# Patient Record
Sex: FEMALE | Race: WHITE | Employment: STUDENT | URBAN - METROPOLITAN AREA
[De-identification: names, ages, dates, MRNs, and addresses within clinical notes are randomized per-mention and may not be internally consistent; named-entity substitution may affect disease eponyms.]

---
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
## 2024-01-02 ENCOUNTER — HOSPITAL ENCOUNTER (OUTPATIENT)
Age: 19
Discharge: HOME | End: 2024-01-02
Payer: COMMERCIAL

## 2024-01-02 VITALS
DIASTOLIC BLOOD PRESSURE: 93 MMHG | HEART RATE: 92 BPM | TEMPERATURE: 99 F | SYSTOLIC BLOOD PRESSURE: 130 MMHG | OXYGEN SATURATION: 100 % | RESPIRATION RATE: 20 BRPM

## 2024-01-02 DIAGNOSIS — R19.7 NAUSEA VOMITING AND DIARRHEA: Primary | ICD-10-CM

## 2024-01-02 DIAGNOSIS — R11.2 NAUSEA VOMITING AND DIARRHEA: Primary | ICD-10-CM

## 2024-01-02 DIAGNOSIS — R10.9 ABDOMINAL PAIN OF UNKNOWN ETIOLOGY: ICD-10-CM

## 2024-01-02 DIAGNOSIS — Z11.52 ENCOUNTER FOR SCREENING FOR COVID-19: ICD-10-CM

## 2024-01-02 LAB
B-HCG UR QL: NEGATIVE
BILIRUB UR QL STRIP: NEGATIVE
CLARITY UR: CLEAR
GLUCOSE UR STRIP-MCNC: NEGATIVE MG/DL
KETONES UR STRIP-MCNC: 80 MG/DL
NITRITE UR QL STRIP: NEGATIVE
PH UR STRIP: 5.5 [PH]
POCT INFLUENZA A: NEGATIVE
POCT INFLUENZA B: NEGATIVE
PROT UR STRIP-MCNC: NEGATIVE MG/DL
SARS-COV-2 RNA RESP QL NAA+PROBE: NOT DETECTED
SP GR UR STRIP: >=1.03
UROBILINOGEN UR STRIP-ACNC: <2 MG/DL

## 2024-01-02 RX ORDER — DICYCLOMINE HCL 20 MG
20 TABLET ORAL 4 TIMES DAILY PRN
Qty: 30 TABLET | Refills: 0 | Status: SHIPPED | OUTPATIENT
Start: 2024-01-02 | End: 2024-02-01

## 2024-01-02 RX ORDER — SODIUM CHLORIDE 9 MG/ML
1000 INJECTION, SOLUTION INTRAVENOUS ONCE
Status: COMPLETED | OUTPATIENT
Start: 2024-01-02 | End: 2024-01-02

## 2024-01-02 RX ORDER — ONDANSETRON 2 MG/ML
4 INJECTION INTRAMUSCULAR; INTRAVENOUS ONCE
Status: COMPLETED | OUTPATIENT
Start: 2024-01-02 | End: 2024-01-02

## 2024-01-02 RX ORDER — ONDANSETRON 4 MG/1
4 TABLET, ORALLY DISINTEGRATING ORAL EVERY 4 HOURS PRN
Qty: 10 TABLET | Refills: 0 | Status: SHIPPED | OUTPATIENT
Start: 2024-01-02 | End: 2024-01-09

## 2024-01-02 RX ORDER — LOPERAMIDE HYDROCHLORIDE 2 MG/1
4 TABLET ORAL AS NEEDED
Qty: 20 TABLET | Refills: 0 | Status: SHIPPED | OUTPATIENT
Start: 2024-01-02 | End: 2024-02-01

## 2024-01-02 NOTE — ED PROVIDER NOTES
Patient Seen in: [facility]      History   No chief complaint on file.    Stated Complaint: stomach issues    Subjective:   HPI  Patient is a 18-year-old female who presents to the immediate care center with mother at bedside reporting concern for nausea, vomiting, diarrhea, and abdominal pain that has been persistent since yesterday.  She feels weak, but has had no headache or dizziness.  The pain in her abdomen is episodic, and in variable location.  She denies recent trauma.          Objective:   Past Medical History:   Diagnosis Date    Anxiety     COVID-19 09/03/2020    History of hidradenitis suppurativa     Axillary and vulvar    Hx of cold sores     HSV TYPE 1     Ovarian cyst     HX OF     Seizures (HCC)     AT AGE 1               Past Surgical History:   Procedure Laterality Date    CYST REMOVAL  2018    WRIST FRACTURE SURGERY Right                 Social History     Socioeconomic History    Marital status: Single   Tobacco Use    Smoking status: Never    Smokeless tobacco: Never   Substance and Sexual Activity    Alcohol use: Never     Alcohol/week: 0.0 standard drinks of alcohol    Drug use: Never    Sexual activity: Never     Birth control/protection: Inserts     Comment: NUVA RING              Review of Systems   Constitutional:  Positive for appetite change and fatigue. Negative for chills and fever.   HENT:  Negative for congestion.    Respiratory:  Negative for cough and shortness of breath.    Cardiovascular:  Negative for chest pain.   Gastrointestinal:  Positive for abdominal pain, diarrhea, nausea and vomiting. Negative for blood in stool.   Genitourinary:  Negative for dysuria.   Musculoskeletal:  Negative for arthralgias and myalgias.   Skin:  Negative for rash.   Neurological:  Negative for dizziness and headaches.       Positive for stated complaint: stomach issues  Other systems are as noted in HPI.  Constitutional and vital signs reviewed.      All other systems reviewed and  negative except as noted above.    Physical Exam     ED Triage Vitals [01/02/24 1410]   BP (!) 130/93   Pulse 92   Resp 20   Temp 98.8 °F (37.1 °C)   Temp src Temporal   SpO2 100 %   O2 Device None (Room air)       Current:BP (!) 130/93   Pulse 92   Temp 98.8 °F (37.1 °C) (Temporal)   Resp 20   LMP 01/02/2024   SpO2 100%         Physical Exam  Vitals and nursing note reviewed.   Constitutional:       General: She is not in acute distress.     Appearance: She is ill-appearing.   Eyes:      Conjunctiva/sclera: Conjunctivae normal.   Pulmonary:      Effort: Pulmonary effort is normal. No respiratory distress.   Abdominal:      General: Abdomen is flat.      Tenderness: There is generalized abdominal tenderness (upper abdomen). Negative signs include McBurney's sign, psoas sign and obturator sign.   Musculoskeletal:      Cervical back: Normal range of motion and neck supple.   Skin:     General: Skin is warm and dry.      Findings: No rash.   Neurological:      Mental Status: She is alert and oriented to person, place, and time.   Psychiatric:         Behavior: Behavior normal.               ED Course     Labs Reviewed   [facility] POCT URINALYSIS DIPSTICK - Abnormal; Notable for the following components:       Result Value    Ketone, Urine 80 (*)     Blood, Urine Trace-Intact (*)     Leukocyte esterase urine Small (*)     All other components within normal limits   POCT PREGNANCY URINE - Normal   POCT FLU TEST - Normal    Narrative:     This assay is a rapid molecular in vitro test utilizing nucleic acid amplification of influenza A and B viral RNA.   RAPID SARS-COV-2 BY PCR - Normal     Medications   sodium chloride 0.9% infusion 1,000 mL (0 mL Intravenous Stopped 1/2/24 1650)   ondansetron (Zofran) 4 MG/2ML injection 4 mg (4 mg Intravenous Given 1/2/24 1517)     Reevaluation after IV fluids and medications: Patient states she feels considerably better, pain is nearly resolved.  She has no nausea.  She is taking p.o.  fluids in the department prior to disposition without difficulty.                 MDM      Differential diagnoses were reviewed in detail prior to disposition.  They were informed of limited resources in the immediate care center to consider many of these differentials.  For this reason we did have conversation about whether she needed to be seen in the emergency department.  Patient and mother both state they would prefer to monitor her closely at home, but they both understand risks and that they will seek immediate follow-up in the emergency department if symptoms worsen.                                       Medical Decision Making  Differential diagnoses considered today include, but are not exclusive of: Acute appendicitis, acute cholecystitis, pancreatitis, pelvic inflammatory disease, pregnancy/ectopic pregnancy, ovarian cyst/torsion, colitis and enteritis.      Problems Addressed:  Abdominal pain of unknown etiology: undiagnosed new problem with uncertain prognosis    Amount and/or Complexity of Data Reviewed  Independent Historian: parent  Labs:  Decision-making details documented in ED Course.    Risk  OTC drugs.  Prescription drug management.        Disposition and Plan     Clinical Impression:  1. Nausea vomiting and diarrhea    2. Encounter for screening for COVID-19    3. Abdominal pain of unknown etiology         Disposition:  Discharge  1/2/2024  4:46 pm    1) Rest  2) push oral fluids  3) tylenol OTC as directed for fever/ pain  4) f/u PCP 5-7 days if symptoms persist  5) Go to ED if persistent vomiting, chest pain, shortness of air, headache, dizziness, neck pain          Follow-up:  Diana Leonard  [address] [address]  [phone]    Schedule an appointment as soon as possible for a visit in 1 week      [facility]  [address] [facility] [id number] [phone]  Go to   If symptoms worsen          Medications Prescribed:  Current  Discharge Medication List        START taking these medications    Details   ondansetron 4 MG Oral Tablet Dispersible Take 1 tablet (4 mg total) by mouth every 4 (four) hours as needed for Nausea.  Qty: 10 tablet, Refills: 0      Loperamide HCl 2 MG Oral Tab Take 2 tablets (4 mg total) by mouth as needed for Diarrhea. Take 2 tablets by mouth with first loose stool; then, take 1 tablet by mouth after each loose stool to a maximum dose of 16 mg (8 tablets) in 24 hours.  Qty: 20 tablet, Refills: 0      dicyclomine 20 MG Oral Tab Take 1 tablet (20 mg total) by mouth 4 (four) times daily as needed.  Qty: 30 tablet, Refills: 0

## 2024-01-02 NOTE — ED INITIAL ASSESSMENT (HPI)
Pt states having symptoms that began last night. Pt states having diarrhea, vomiting and stomach pain. Pt states having vomiting , pt states has been unable to keep anything down.

## (undated) NOTE — ED AVS SNAPSHOT
Parent/Legal Guardian Access to the Online [employer] Record of a Patient 15to 16Years Old  Return completed form by Secure email to [place] [facility]/Medical Records Department: [email or web address]     Requirements and Procedures   Under [facility] MyChart ID and password with another person, that person may be able to view my or my child’s health information, and health information about someone who has authorized me as a MyChart proxy.    ·  I agree that it is my responsibility to select a confident Sign-Up Form and I agree to its terms.        Authorization Form     Please enter Patient’s information below:   Name (last, first, middle initial) __________________________________________   Gender  Male  Female    Last 4 Digits of Social Security Number Parent/Legal Guardian Signature                                  For Patient (1517 years of age)  I agree to allow my parent/legal guardian, named above, online access to my medical information currently available and that may become available as a result

## (undated) NOTE — LETTER
[address]  [address] [address]  Dept: [phone]      January 15, 2018    Patient: Tristen Diaz   Date of Visit: 1/15/2018       To Whom It May Concern:    Tristen Diaz was seen and treated in our Immed